# Patient Record
Sex: FEMALE
[De-identification: names, ages, dates, MRNs, and addresses within clinical notes are randomized per-mention and may not be internally consistent; named-entity substitution may affect disease eponyms.]

---

## 2020-06-29 ENCOUNTER — APPOINTMENT (OUTPATIENT)
Dept: VASCULAR SURGERY | Facility: CLINIC | Age: 60
End: 2020-06-29

## 2020-08-17 ENCOUNTER — APPOINTMENT (OUTPATIENT)
Dept: VASCULAR SURGERY | Facility: CLINIC | Age: 60
End: 2020-08-17
Payer: COMMERCIAL

## 2020-08-17 VITALS
SYSTOLIC BLOOD PRESSURE: 117 MMHG | HEIGHT: 60 IN | DIASTOLIC BLOOD PRESSURE: 80 MMHG | TEMPERATURE: 98.2 F | BODY MASS INDEX: 25.52 KG/M2 | HEART RATE: 57 BPM | WEIGHT: 130 LBS

## 2020-08-17 PROCEDURE — 93970 EXTREMITY STUDY: CPT

## 2020-08-17 PROCEDURE — 99203 OFFICE O/P NEW LOW 30 MIN: CPT

## 2020-08-17 PROCEDURE — 93979 VASCULAR STUDY: CPT

## 2020-08-17 NOTE — PHYSICAL EXAM
[de-identified] : On physical examination the patient is NAD. Neurologically intact. The lungs are clear to auscultation and the heart has a regular rate and rhythm. The abdomen is benign. Bilateral femoral pulses and pedal pulses are palpable. Large varicosities of medial left thigh and anterior leg. Varicose veins of right medial thigh. Skin pigmentation changes of both ankles.\par \par

## 2020-08-17 NOTE — ASSESSMENT
[FreeTextEntry1] : A lower extremity venous duplex was obtained in the office today, which showed:\par -No evidence of DVT/SVT in either extremity\par -Reflux in the accessory left saphenous vein with large tortuous varicosities\par \par In summary, Mrs. Dunne presents with left lower extremity venous insufficiency. We will schedule her for ASV ablation, followed by stab phlebectomy if needed. She should continue to wear compression stockings daily, keep her legs elevated when possible and exercise regularly.\par \par Thank you for allowing me to participate in the care of this nice lady. Please do not hesitate to contact me with any questions. \par

## 2020-08-17 NOTE — HISTORY OF PRESENT ILLNESS
[FreeTextEntry1] : I have just had the pleasure of seeing Mrs. Karen Dunne, in consultation for lower extremity venous insufficiency.\par \par Mrs. Dunne is a pleasant, otherwise healthy, 59-year-old lady who presents with a long history of lower extremity venous insufficiency. She has a history of venous stasis ulcers, treated at our institution with Unna boots. Additionally, she underwent procedures for varicose veins at [facility]. She presents with large varicose veins, most pronounced along the medial aspect of the left thigh and anterior left leg. She denies any history of DVT, SVT, or other thromboembolic phenomena.  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 She has been wearing compression stockings for many years.\par \par Mrs. Dunne denies any history of lower extremity claudication, rest pain, or tissue loss. Mrs. Mustafa denies any history of CAD, MI, CHF, CRI, DM, CVA, or TIA.\par \par Medications: Aspirin\par \par Allergies: NKDA\par \par Social history: Non-smoker\par \par FH: NC\par

## 2020-08-27 RX ORDER — LIDOCAINE HYDROCHLORIDE 10 MG/ML
1 INJECTION, SOLUTION INFILTRATION; PERINEURAL
Qty: 50 | Refills: 0 | Status: COMPLETED | COMMUNITY
Start: 2020-08-27 | End: 2020-09-03

## 2020-08-27 RX ORDER — SODIUM BICARBONATE 84 MG/ML
8.4 INJECTION, SOLUTION INTRAVENOUS
Qty: 5 | Refills: 0 | Status: COMPLETED | COMMUNITY
Start: 2020-08-27 | End: 2020-09-03

## 2020-08-31 ENCOUNTER — APPOINTMENT (OUTPATIENT)
Dept: VASCULAR SURGERY | Facility: CLINIC | Age: 60
End: 2020-08-31

## 2020-08-31 DIAGNOSIS — I83.90 ASYMPTOMATIC VARICOSE VEINS OF UNSPECIFIED LOWER EXTREMITY: ICD-10-CM

## 2020-08-31 RX ORDER — SODIUM BICARBONATE 84 MG/ML
8.4 INJECTION, SOLUTION INTRAVENOUS
Qty: 1 | Refills: 0 | Status: ACTIVE | COMMUNITY
Start: 2020-08-31 | End: 1900-01-01

## 2020-08-31 RX ORDER — LIDOCAINE HYDROCHLORIDE 10 MG/ML
1 INJECTION, SOLUTION INFILTRATION; PERINEURAL
Qty: 1 | Refills: 0 | Status: ACTIVE | COMMUNITY
Start: 2020-08-31 | End: 1900-01-01

## 2020-08-31 RX ORDER — SODIUM CHLORIDE 9 G/ML
0.9 INJECTION, SOLUTION INTRAVENOUS
Qty: 1 | Refills: 0 | Status: ACTIVE | COMMUNITY
Start: 2020-08-31 | End: 1900-01-01

## 2020-09-03 ENCOUNTER — APPOINTMENT (OUTPATIENT)
Dept: VASCULAR SURGERY | Facility: CLINIC | Age: 60
End: 2020-09-03
Payer: COMMERCIAL

## 2020-09-03 DIAGNOSIS — I83.892 VARICOSE VEINS OF LEFT LOWER EXTREMITY WITH OTHER COMPLICATIONS: ICD-10-CM

## 2020-09-03 PROCEDURE — 36475 ENDOVENOUS RF 1ST VEIN: CPT | Mod: LT

## 2020-09-03 NOTE — PROCEDURE
[FreeTextEntry1] : left AGSV RFA [FreeTextEntry3] : Procedural safety checklist and time out completed:\par Confirmed patient identification (Patient Name, , and/or medical record number including when possible affirmation by patient or parent/family/other).\par Confirmed procedure with the patient. Consent present, accurate and signed. \par Confirmed special equipment and supplies are present.\par Sterility confirmed. Position verified. \par Site/ side is marked and visible and confirmed. \par Procedure confirmed by consent. Accurate consent including side and site.\par Review of medical records, including venous ultrasound, noting correct procedure including site and side.\par MD/PA verifies presence and review of imaging studies and or written report of imaging studies.\par Agreement on the procedure to be performed\par Time out completed.\par All of the above has been confirmed by the team.\par All patient-specific concerns have been addressed. \par \par Indication: Left lower extremity varicose veins with inflammation, leg pain, leg swelling, and leg cramping.  Venous insufficiency/ reflux.\par \par Procedure: radiofrequency ablation of the anterior branch of the left great saphenous vein. \par 	\par Ms. ERICKA AMOR is a 60 year old F with a history of  left lower extremity varicose veins previously seen in the office.  Ultrasound examination demonstrated venous insufficiency. A trial of compression stockings, exercise, elevation, and pain medication was attempted without relief and definitive treatment with radiofrequency ablation was offered. \par \par The patient has come for radiofrequency ablation treatment of the anterior branch of the left great saphenous vein. Pre-procedure COVID PCR test was negative.\par I have discussed the risks of the procedure at length with the patient. The risks discussed were inclusive of but not limited to infection, irritation at the site of infiltration of local anesthesia, and also rare risk of deep venous thrombosis and pulmonary emboli. The patient agrees to proceed with the procedure. \par The patient was escorted into the procedure room and a time out called.\par The entire limb was prepped and draped in sterile fashion. The RF fiber was placed on the sterile field and connected by a sterile cable. Actuation, temperature, and impedance testing were performed to ensure that all components were connected and operating properly. \par The patient was placed on the procedure table and local anesthesia was instilled in the skin overlying the access site. Under ultrasound guidance, the vein was punctured with a micropuncture needle, using the anterior wall technique. A guide wire was now introduced through the needle, and the needle was then exchanged over the guide wire for a 7F sheath. The guide wire was removed and the RF probe was then placed into the anterior branch of the left great saphenous vein through the sheath and position confirmed using ultrasound guidance. After the RF probe position was verified by ultrasound, tumescent anesthesia consisting of normal saline, 1% lidocaine with 8.4% sodium bicarbonate was infiltrated, under ultrasound guidance, precisely into the perivenous compartment along the entire length of the vein until a “halo” of fluid was noted around the vein. After RF probe position was again confirmed with ultrasound imaging, RF energy was applied. The probe was gradually and carefully withdrawn at a rate of 6.5cm/20seconds. \par \par 3 cycles of RF performed using the 3 cm probe\par Total treatment time was 60 seconds.\par The total volume injected was 100 cc\par Treatment length was 3 cm and \par The probe is 3.7 cm from the SFJ.\par \par Estimated Blood Loss: minimal\par Repeat ultrasound of the treated vein was performed confirming successful treatment. The catheter and sheath were withdrawn and hemostasis established with direct pressure. After assuring hemostasis, a sterile 4x4 was placed on the access site and an ACE compression wrap was applied. Patient tolerated procedure well. Patient was given post-procedure instructions and follow up appointment was scheduled.\par \par \par

## 2020-09-08 ENCOUNTER — APPOINTMENT (OUTPATIENT)
Dept: VASCULAR SURGERY | Facility: CLINIC | Age: 60
End: 2020-09-08
Payer: COMMERCIAL

## 2020-09-08 PROCEDURE — 93971 EXTREMITY STUDY: CPT

## 2020-10-05 ENCOUNTER — APPOINTMENT (OUTPATIENT)
Dept: VASCULAR SURGERY | Facility: CLINIC | Age: 60
End: 2020-10-05

## 2020-10-19 ENCOUNTER — APPOINTMENT (OUTPATIENT)
Dept: VASCULAR SURGERY | Facility: CLINIC | Age: 60
End: 2020-10-19
Payer: COMMERCIAL

## 2020-10-19 VITALS
DIASTOLIC BLOOD PRESSURE: 76 MMHG | HEIGHT: 60 IN | HEART RATE: 60 BPM | BODY MASS INDEX: 25.52 KG/M2 | TEMPERATURE: 95.3 F | WEIGHT: 130 LBS | SYSTOLIC BLOOD PRESSURE: 119 MMHG

## 2020-10-19 PROCEDURE — 99072 ADDL SUPL MATRL&STAF TM PHE: CPT

## 2020-10-19 PROCEDURE — 99213 OFFICE O/P EST LOW 20 MIN: CPT

## 2020-10-19 NOTE — PHYSICAL EXAM
[de-identified] : On physical examination the patient is NAD. Neurologically intact. The lungs are clear to auscultation and the heart has a regular rate and rhythm. The abdomen is benign. Bilateral femoral pulses and pedal pulses are palpable. Large varicosities of medial left thigh and anterior leg. Varicose veins of right medial thigh. Skin pigmentation changes of both ankles.\par \par

## 2020-10-19 NOTE — HISTORY OF PRESENT ILLNESS
[FreeTextEntry1] : I have just had the pleasure of seeing Mrs. Karen Dunne, in follow up for lower extremity venous insufficiency.\par \par Mrs. Dunne is a pleasant, otherwise healthy, 60-year-old lady who presented with a long history of lower extremity venous insufficiency. She has a history of venous stasis ulcers, treated at our institution with Unna boots. Additionally, she underwent procedures for varicose veins at [facility]. She presented with large varicose veins, most pronounced along the medial aspect of the left thigh and anterior left leg. She denied any history of DVT, SVT, or other thromboembolic phenomena.  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 She has been wearing compression stockings for many years. On 9/3/2020, Mrs. Dunne underwent left accessory GSV RFA. Her follow up duplex showed that the vein was closed and there was no evidence of DVT. She has persistent bilateral thigh/leg varicosities.\par \par Mrs. Dunne denies any history of lower extremity claudication, rest pain, or tissue loss. Mrs. Mustafa denies any history of CAD, MI, CHF, CRI, DM, CVA, or TIA.\par \par Medications: Aspirin\par \par Allergies: NKDA\par \par Social history: Non-smoker\par \par FH: NC\par

## 2020-10-19 NOTE — ASSESSMENT
[FreeTextEntry1] : In summary, Mrs. Dunne is s/p left AGSV RFA. She would like to defer lower extremity stab phlebectomy for now. She will contact me when she is ready to undergo the procedure.

## 2024-05-23 ENCOUNTER — EMERGENCY (EMERGENCY)
Facility: HOSPITAL | Age: 64
LOS: 1 days | Discharge: HOME | End: 2024-05-23
Admitting: EMERGENCY MEDICINE
Payer: COMMERCIAL

## 2024-05-23 VITALS
OXYGEN SATURATION: 100 % | HEART RATE: 72 BPM | TEMPERATURE: 98 F | SYSTOLIC BLOOD PRESSURE: 117 MMHG | RESPIRATION RATE: 17 BRPM | DIASTOLIC BLOOD PRESSURE: 80 MMHG

## 2024-05-23 PROCEDURE — 99283 EMERGENCY DEPT VISIT LOW MDM: CPT

## 2024-05-23 NOTE — ED PROVIDER NOTE - NSFOLLOWUPINSTRUCTIONS_ED_ALL_ED_FT
English    Bleeding Varicose Veins  Varicose veins are veins that have become enlarged and twisted due to damaged valves in the veins. Valves in the veins help return blood from the vein to the heart. If these valves are damaged, blood flows backward and backs up into the veins. This causes increased pressure within the veins, which may cause the veins to rupture and bleed.    Bleeding of the varicose veins can be internal or external. External bleeding is bleeding outside the skin and can be caused from a nick or scratch in the skin on top of the varicose vein. Internal bleeding occurs under the skin and often results from direct trauma to the varicose vein, like tripping, falling, or bumping the vein on furniture.    Intradermal bleeding is another form of bleeding that can occur with small varicose veins called spider veins. In this situation, the rupture of a spider vein occurs underneath the skin surface, creating a blue-purple bruise that is visible through the skin. This problem is normally mild and usually gets better on its own without treatment.    What are the causes?  This condition may be caused by a cut or direct hit to the skin over the varicose vein. In some cases, bleeding can occur without direct trauma to the vein. This can result from:  Thinning and stretching of the skin that covers the varicose veins (hypoplasia).  Weak, thinning vein walls.  High blood pressure in the veins, due to the backup of blood that normally flows back to the heart.  A growth in the pelvis (pelvic mass) that affects the veins in the legs.  Pregnancy and childbirth.  Blood clots, especially in deep veins (thrombophlebitis).  What increases the risk?  You are more likely to develop this condition if you:  Have a family history of varicose veins.  Are on your feet a lot or are standing for long periods of time.  Are pregnant or have had a previous pregnancy.  Are overweight.  Use birth control pills (oral contraceptives).  Have an inactive (sedentary) lifestyle.  Have a history of deep vein thrombosis (DVT).  What are the signs or symptoms?  If bleeding occurs internally, or under the skin, symptoms may include:  Blue or purple discoloration in the skin that spreads beyond the veins.  Itchy and discolored skin.  Heaviness, aching and throbbing pain, and cramps in the legs, especially after long periods of standing, wearing tight clothing, or being in a hot climate.  Severe skin dryness (varicose eczema).  A burning sensation.  Dizziness and sometimes fainting.  External bleeding occurs on the surface of the skin, most often after a cut, scrape, bruise, or other physical trauma. When this occurs, it is crucial to put pressure on the vein and stop the bleeding.    How is this diagnosed?  This condition may be diagnosed based on your symptoms, including when you first noticed any bleeding or pain.    How is this treated?  Treatment may include:  Stopping bleeding by applying pressure to the vein with a clean towel, bandage, or fabric.  Stopping swelling by applying pressure (compression) to the area over a longer period of time. This may be done by applying an elastic bandage or wearing compression stockings.  Raising (elevating) your leg above the level of your heart for 30 minutes a few times a day.  Follow these instructions at home:  Compression stockings on a person's lower legs.  Medicines    Take and use over-the-counter and prescription medicines and creams only as told by your health care provider.  If you were prescribed an antibiotic cream, use it as told by your health care provider. Do not stop using the antibiotic even if your condition improves.  Lifestyle    A couple holding hands and walking.  Do not use any products that contain nicotine or tobacco. These products include cigarettes, chewing tobacco, and vaping devices, such as e-cigarettes. If you need help quitting, ask your health care provider.  Exercise regularly and do exercises as told by your health care provider.  If directed, work with your health care provider to lose weight.  General instructions    Wear compression stockings or apply elastic bandages or any wraps as told by your health care provider. These help to prevent blood clots and reduce swelling in your legs.  Try to avoid sitting or standing for long periods of time. If you need to sit or stand for a long time, move around often to maintain blood flow (circulation).  Elevate your legs above the level of your heart for 30 minutes, 4 times a day, or as often as directed. To do this, lie down with your leg propped up on a pillow or cushion so that your foot is above heart level. Doing this regularly can help prevent more bleeding from developing.  Check your skin every day for new sores and signs of bleeding.  Avoid wearing high-heeled shoes and tight clothing, especially around your limbs and waist.  Be careful in situations where you could cut your legs, such as when shaving or gardening. This can help prevent bleeding.  Contact a health care provider if:  Your veins bleed above or under your skin.  You have pain that gets worse.  The area around a varicose vein becomes warm, red, or tender to the touch.  You develop new sores or a rash near your varicose veins.  You have a sore that does not heal, gets infected, or gets bigger.  You have bad-smelling, yellowish fluid coming from a spot where there was external bleeding.  You have a fever.  Get help right away if:  You have chest pain.  You have trouble breathing.  You have severe leg pain.  Your legs and feet are turning blue or black.  Your legs swell and harden.  You have bleeding from your varicose veins that does not stop.  You have dizziness or you have fainted.  Summary  Varicose veins are veins that have become enlarged and twisted due to damaged valves in the veins. They may bleed under the skin (internal) or on the surface of the skin (external).  Treatment may include pressure on the vein, elevating your legs, wearing compression stockings, and lifestyle changes.  You should exercise regularly, maintain a healthy weight, and avoid smoking.  Check your skin every day for new sores, signs of bleeding, or other problems.  This information is not intended to replace advice given to you by your health care provider. Make sure you discuss any questions you have with your health care provider.

## 2024-05-23 NOTE — ED PROVIDER NOTE - OBJECTIVE STATEMENT
63-year-old female with history of varicose veins presenting for evaluation of bleeding varicose vein.  Patient reports bleeding occurred for approximately 2 minutes.  States that she was able to control bleeding with pressure dressing.  No associated lightheadedness, dizziness, palpitations,.  Patient just takes baby aspirin

## 2024-05-23 NOTE — ED ADULT TRIAGE NOTE - CHIEF COMPLAINT QUOTE
Pt c/o bleeding of her varicose vein to the left ankle. Bleeding controlled in triage. Takes baby aspirin. No other medical history.

## 2024-05-23 NOTE — ED PROVIDER NOTE - CLINICAL SUMMARY MEDICAL DECISION MAKING FREE TEXT BOX
63-year-old female with history of varicose veins presenting for evaluation of bleeding varicose vein.  Patient reports bleeding occurred for approximately 2 minutes. on presentation patient well appearing, vitals stable. bleeding, controlled. dressing with will be changed. patient stable for discharge.

## 2024-05-23 NOTE — ED ADULT NURSE NOTE - NSFALLUNIVINTERV_ED_ALL_ED
Bed/Stretcher in lowest position, wheels locked, appropriate side rails in place/Call bell, personal items and telephone in reach/Instruct patient to call for assistance before getting out of bed/chair/stretcher/Non-slip footwear applied when patient is off stretcher/[place] to call system/Physically safe environment - no spills, clutter or unnecessary equipment/Purposeful proactive rounding/Room/bathroom lighting operational, light cord in reach

## 2024-05-23 NOTE — ED PROVIDER NOTE - MUSCULOSKELETAL, MLM
+ varicose veins. small punctate ulcer to the medial ankle. wound not actively bleeding. pulses present. Spine appears normal, range of motion is not limited, no muscle or joint tenderness

## 2024-05-23 NOTE — ED PROVIDER NOTE - PATIENT PORTAL LINK FT
You can access the FollowMyHealth Patient Portal offered by [facility] by registering at the following website: [email or web address]. By joining [facility]’s FollowMyHealth portal, you will also be able to view your health information using other applications (apps) compatible with our system.

## 2024-05-23 NOTE — ED PROVIDER NOTE - ADDITIONAL NOTES AND INSTRUCTIONS:
Please excuse the absence of Ms. Dunne. She was evaluation in the emergency room for leg injury. She may return to work on 6/15/2024.

## 2024-05-23 NOTE — ED ADULT NURSE NOTE - OBJECTIVE STATEMENT
received pt RW. A&OX4 RR even unlabored completing full clear sentences. past hx of varicose veins presents endorsing bleeding of varicose vein.  pt states bleeding occurred for ~2 minutes.  pt states that she was able to control bleeding with pressure dressing. on assessment bleeding controlled, PA smartt at bedside for further management. denies h/a, lightheadedness, dizziness, palpitations, abd pain, nvd, gu sx, falls/trauma. pt does endorse to taking daily baby aspirin. all safety measures maintained throughout care.

## 2025-05-25 NOTE — ED ADULT NURSE NOTE - NSFALLCONCLUSION_ED_ALL_ED
Universal Safety Interventions Patient requests all Lab, Cardiology, and Radiology Results on their Discharge Instructions